# Patient Record
Sex: MALE | Race: OTHER | Employment: FULL TIME | URBAN - METROPOLITAN AREA
[De-identification: names, ages, dates, MRNs, and addresses within clinical notes are randomized per-mention and may not be internally consistent; named-entity substitution may affect disease eponyms.]

---

## 2022-05-26 ENCOUNTER — HOSPITAL ENCOUNTER (EMERGENCY)
Age: 22
Discharge: HOME | End: 2022-05-26

## 2022-05-26 VITALS
DIASTOLIC BLOOD PRESSURE: 95 MMHG | WEIGHT: 190 LBS | TEMPERATURE: 97.6 F | OXYGEN SATURATION: 100 % | HEIGHT: 65 IN | RESPIRATION RATE: 16 BRPM | HEART RATE: 80 BPM | BODY MASS INDEX: 31.65 KG/M2 | SYSTOLIC BLOOD PRESSURE: 153 MMHG

## 2022-05-26 DIAGNOSIS — Q82.8 CUTIS VERTICIS GYRATA: Primary | ICD-10-CM

## 2022-05-26 PROCEDURE — 99282 EMERGENCY DEPT VISIT SF MDM: CPT

## 2022-05-26 ASSESSMENT — PAIN - FUNCTIONAL ASSESSMENT: PAIN_FUNCTIONAL_ASSESSMENT: 0-10

## 2022-05-26 ASSESSMENT — PAIN SCALES - GENERAL: PAINLEVEL_OUTOF10: 3

## 2022-05-26 NOTE — ED PROVIDER NOTES
[facility]        Pt Name: Timothy Craig  MRN: [id number]  [place] 2000  Date of evaluation: 5/26/2022  Provider: VICTOR MANUEL Hensley  PCP: No primary care provider on file. Note Started: 2:14 PM EDT       ADAIR. I have evaluated this patient. My supervising physician was available for consultation. CHIEF COMPLAINT       Chief Complaint   Patient presents with    Wound Infection     pt noticied several bumps on his head, states they are painful, no new shampoo       HISTORY OF PRESENT ILLNESS   (Location, Timing/Onset, Context/Setting, Quality, Duration, Modifying Factors, Severity, Associated Signs and Symptoms)  Note limiting factors. Chief Complaint: Lumps on scalp bilaterally    Terrance Morin is a 22 y.o. male who presents lumps over his scalp bilaterally. Patient denies any new shampoos or lotions or anything. Patient states that he does use a helmet while at work. 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 Patient states that pain is very minor and he has not needed to take any medication for his pain. Patient denies any fevers, chills, nausea, vomiting, abdominal pain or chest pain or shortness of breath. Nursing Notes were all reviewed and agreed with or any disagreements were addressed in the HPI. REVIEW OF SYSTEMS    (2-9 systems for level 4, 10 or more for level 5)     Review of Systems    Positives and Pertinent negatives as per HPI. Except as noted above in the ROS, all other systems were reviewed and negative. PAST MEDICAL HISTORY   No past medical history on file. SURGICAL HISTORY   No past surgical history on file. CURRENTMEDICATIONS       There are no discharge medications for this patient. ALLERGIES     Patient has no known allergies. FAMILYHISTORY     No family history on file. SOCIAL HISTORY       Social History     Tobacco Use    Smoking status: Light Tobacco Smoker    Smokeless tobacco: Never Used   Vaping Use    Vaping Use: Never used   Substance Use Topics    Alcohol use: Yes     Comment: socially    Drug use: Never       SCREENINGS    [place] Coma Scale  Eye Opening: Spontaneous  Best Verbal Response: Oriented  Best Motor Response: Obeys commands  [place] Coma Scale Score: 15        PHYSICAL EXAM    (up to 7 for level 4, 8 or more for level 5)     ED Triage Vitals [05/26/22 1325]   BP Temp Temp Source Heart Rate Resp SpO2 Height Weight   (!) 153/95 97.6 °F (36.4 °C) Tympanic 80 16 100 % 5' 5\" (1.651 m) 190 lb (86.2 kg)       Physical Exam  Vitals and nursing note reviewed. Constitutional:       General: He is not in acute distress. Appearance: Normal appearance. He is normal weight. He is not ill-appearing. HENT:      Head:      Comments: Palpable lumps on scalp bilaterally running anterior to posterior to them. The skin appears clear without any signs of infection, lesions or vesicles. There is mild tenderness to palpation over these areas. Cardiovascular:      Rate and Rhythm: Normal rate and regular rhythm. Pulses: Normal pulses. Heart sounds: Normal heart sounds. Pulmonary:      Effort: Pulmonary effort is normal.      Breath sounds: Normal breath sounds. Musculoskeletal:      Cervical back: Normal range of motion and neck supple. Skin:     General: Skin is warm. Neurological:      Mental Status: He is alert and oriented to person, place, and time. Psychiatric:         Mood and Affect: Mood normal.         DIAGNOSTIC RESULTS   LABS:    Labs Reviewed - No data to display    When ordered only abnormal lab results are displayed. All other labs were within normal range or not returned as of this dictation. EKG:  When ordered, EKG's are interpreted by the Emergency Department Physician in the absence of a cardiologist.  Please see their note for interpretation of EKG. RADIOLOGY:   Non-plain film images such as CT, Ultrasound and MRI are read by the radiologist. Plain radiographic images are visualized and preliminarily interpreted by the ED Provider with the below findings:        Interpretation per the Radiologist below, if available at the time of this note:    No orders to display     No results found. PROCEDURES   Unless otherwise noted below, none     Procedures    CRITICAL CARE TIME       CONSULTS:  None      EMERGENCY DEPARTMENT COURSE and DIFFERENTIAL DIAGNOSIS/MDM:   Vitals:    Vitals:    05/26/22 1325   BP: (!) 153/95   Pulse: 80   Resp: 16   Temp: 97.6 °F (36.4 °C)   TempSrc: Tympanic   SpO2: 100%   Weight: 190 lb (86.2 kg)   Height: 5' 5\" (1.651 m)       Patient was given the following medications:  Medications - No data to display      Is this patient to be included in the SEP-1 Core Measure due to severe sepsis or septic shock? No   Exclusion criteria - the patient is NOT to be included for SEP-1 Core Measure due to: Infection is not suspected    80-year-old male presents with bumps on his head over the last several days. Patient is mildly tender at times. Patient states that he has been wearing a helmet at work over the last several weeks. On exam there is no signs of acute infection, skin lesions or concern for tinea infection. There is noticeable lumps bilaterally over the top of scalp around anterior to posterior. Patient states that he was told by a physician in [facility] that he has cutis verticis gyrata. This seems consistent with his symptoms today. I have no concern for any acute infection at this time. Patient be discharged at this time given primary care follow-up to continue to monitor these areas. Patient denies any fevers chills or headaches. All questions were answered at time of discharge. FINAL IMPRESSION      1.  Cutis verticis gyrata          DISPOSITION/PLAN   DISPOSITION Decision To Discharge 05/26/2022 02:29:28 PM      PATIENT REFERRED TO:  [facility] Emergency Department  [address] [place]  [phone]    As needed, If symptoms worsen    [facility]    Schedule an appointment as soon as possible for a visit in 1 week  recheck      DISCHARGE MEDICATIONS:  There are no discharge medications for this patient. DISCONTINUED MEDICATIONS:  There are no discharge medications for this patient.              (Please note that portions of this note were completed with a voice recognition program.  Efforts were made to edit the dictations but occasionally words are mis-transcribed.)    VICTOR MANUEL Salter (electronically signed)            Gracia Salterma  05/26/22 9393

## 2022-05-26 NOTE — Clinical Note
Roger Butler was seen and treated in our emergency department on 5/26/2022. He may return to work on 05/27/2022. If you have any questions or concerns, please don't hesitate to call.       VICTOR MANUEL Ugalde

## 2022-05-27 ENCOUNTER — HOSPITAL ENCOUNTER (EMERGENCY)
Age: 22
Discharge: HOME | End: 2022-05-27
Attending: EMERGENCY MEDICINE

## 2022-05-27 VITALS
TEMPERATURE: 98.6 F | DIASTOLIC BLOOD PRESSURE: 89 MMHG | RESPIRATION RATE: 16 BRPM | WEIGHT: 190 LBS | HEART RATE: 77 BPM | SYSTOLIC BLOOD PRESSURE: 144 MMHG | BODY MASS INDEX: 30.53 KG/M2 | HEIGHT: 66 IN | OXYGEN SATURATION: 99 %

## 2022-05-27 DIAGNOSIS — R55 NEAR SYNCOPE: Primary | ICD-10-CM

## 2022-05-27 LAB
EKG ATRIAL RATE: 71 BPM
EKG DIAGNOSIS: NORMAL
EKG P AXIS: 48 DEGREES
EKG P-R INTERVAL: 166 MS
EKG Q-T INTERVAL: 358 MS
EKG QRS DURATION: 100 MS
EKG QTC CALCULATION (BAZETT): 389 MS
EKG R AXIS: 53 DEGREES
EKG T AXIS: 19 DEGREES
EKG VENTRICULAR RATE: 71 BPM

## 2022-05-27 PROCEDURE — 99283 EMERGENCY DEPT VISIT LOW MDM: CPT

## 2022-05-27 PROCEDURE — 93005 ELECTROCARDIOGRAM TRACING: CPT | Performed by: NURSE PRACTITIONER

## 2022-05-27 PROCEDURE — 93010 ELECTROCARDIOGRAM REPORT: CPT | Performed by: INTERNAL MEDICINE

## 2022-05-27 ASSESSMENT — ENCOUNTER SYMPTOMS
CHEST TIGHTNESS: 0
ABDOMINAL PAIN: 0
NAUSEA: 0
DIARRHEA: 0
VOMITING: 0
SHORTNESS OF BREATH: 0

## 2022-05-27 ASSESSMENT — VISUAL ACUITY
OU: 20/30
OD: 20/25
OS: 20/30

## 2022-05-27 ASSESSMENT — PAIN DESCRIPTION - DESCRIPTORS: DESCRIPTORS: ACHING

## 2022-05-27 ASSESSMENT — PAIN - FUNCTIONAL ASSESSMENT: PAIN_FUNCTIONAL_ASSESSMENT: 0-10

## 2022-05-27 ASSESSMENT — PAIN DESCRIPTION - LOCATION: LOCATION: HEAD

## 2022-05-27 ASSESSMENT — PAIN DESCRIPTION - FREQUENCY: FREQUENCY: CONTINUOUS

## 2022-05-27 ASSESSMENT — LIFESTYLE VARIABLES
HOW OFTEN DO YOU HAVE A DRINK CONTAINING ALCOHOL: MONTHLY OR LESS
HOW MANY STANDARD DRINKS CONTAINING ALCOHOL DO YOU HAVE ON A TYPICAL DAY: 1 OR 2

## 2022-05-27 ASSESSMENT — PAIN SCALES - GENERAL: PAINLEVEL_OUTOF10: 7

## 2022-05-27 ASSESSMENT — PAIN DESCRIPTION - ORIENTATION: ORIENTATION: LEFT;RIGHT

## 2022-05-27 ASSESSMENT — PAIN DESCRIPTION - PAIN TYPE: TYPE: ACUTE PAIN

## 2022-05-27 ASSESSMENT — PAIN DESCRIPTION - ONSET: ONSET: ON-GOING

## 2022-05-27 NOTE — LETTER
[facility] [place] [place] Emergency Department  [address] [facility], [address]             May 27, 2022    Patient: Jamshid Martinez   YOB: 2000   Date of Visit: 5/27/2022       To Whom It May Concern:    Terrance Dao Arvizu was seen and treated in our emergency department on 5/27/2022. He may return to work on 5/28/2022.       Sincerely,         [facility]

## 2022-05-27 NOTE — ED PROVIDER NOTES
[facility]    Physician Attestation    Pt Name: Linda Gan  MRN: [id number]  [place] 2000  Date of evaluation: 5/27/22        Physician Note:    I havepersonally performed and/or participated in the history, exam and medical decision making and agree with all pertinent clinical information. I have also reviewed and agree with the past medical, family and social historyunless otherwise noted. I have personally performed a face to face diagnostic evaluation onthis patient. I have reviewed the mid-levels findings and agree. History: Was seen earlier for his on his scalp which has been diagnosed as cutis verticus gyrata thickening of the scalp states that since then he noticed that his vision became black for about a minute but he did not pass out he also had a headache on the left side of his head and felt pain on the right side of his body but he feels better now  Patient states that things went black for about a minute and he could not move he did not pass out completely    REVIEW OF SYSTEMS    Constitutional:  Denies fever, chills, or weakness   Eyes:  Denies vision changes  HENT:  Denies sore throat or neck pain   Respiratory:  Denies cough or shortness of breath   Cardiovascular:  Denies chest pain  GI:  Denies abdominal pain, nausea, vomiting, or diarrhea   Musculoskeletal:  Denies back pain   Skin: no rash or vesicles   Neurologic:  no headache weakness focal    Lymphatic:  no swollen  nodes   Psychiatric: no si or hs thoughts     All systems negative except as marked. General Appearance:  Alert, cooperative, no distress, appears stated age. Head:  Normocephalic, without obvious abnormality, atraumatic. Eyes:  conjunctiva/corneas clear, EOM's intact. Sclera anicteric.    ENT: Mucous membranes moist.  Patient does have thickened elevated areas of his scalp almost like grooves   Neck: Supple, symmetrical, trachea midline, no adenopathy. No jugular venous distention. Lungs:   No Respiratory Distress. no rales  rhonchi rub   Chest Wall:  Nontender  no deformity   Heart:  Rsr no murmer gallop    Abdomen:   Soft nontender no organomegally    Extremities:  Full range of motion. no deformity   Pulses: Equal  upper and lower    Skin:  No rashes or lesions to exposed skin. Neurologic: Alert and oriented X 3. Motor grossly normal.  Speech clear. EKG Interpretation    Interpreted by emergency department physician    Rhythm: normal sinus   Rate: normal  Axis: normal  Ectopy: none  Conduction: normal  ST Segments: no acute change  T Waves: no acute change  Q Waves: none    Clinical Impression: Normal sinus rhythm    Clara Calderon MD    EKG unremarkable exam is unremarkable patient is feeling much better visual acuity was documented patient will be discharged to follow-up with his doctor    1. Near syncope          DISPOSITION/PLAN  PATIENT REFERRED TO:  your doctor          DISCHARGE MEDICATIONS:  New Prescriptions    No medications on file         Is this patient to be included in the SEP-1 Core Measure due to severe sepsis or septic shock? No   Exclusion criteria - the patient is NOT to be included for SEP-1 Core Measure due to:   Infection is not suspected      MD Clara Cruz MD  05/27/22 5588

## 2022-05-27 NOTE — ED PROVIDER NOTES
[facility]        Pt Name: Nasra Locke  MRN: [id number]  [place] 2000  Date of evaluation: 5/27/2022  Provider: GRAYSON Rocha CNP  PCP: No primary care provider on file. Note Started: 3:01 AM EDT        I have seen and evaluated this patient with my supervising physician [address] [place] [address]       Chief Complaint   Patient presents with    Fatigue     Eyes went black when I woke up and pain over half my body (right) and headache on left side of my head. States he was seen for weakness earlier today. HISTORY OF PRESENT ILLNESS   (Location, Timing/Onset, Context/Setting, Quality, Duration, Modifying Factors, Severity, Associated Signs and Symptoms)  Note limiting factors. Chief Complaint: Brief episode of dark vision lasting less than 1 minute with mild headache on left side and felt pain to the right side of his body. Nasra Locke is a 22 y.o. male who presents to the emergency department with complaint of Brief episode of dark vision lasting less than 1 minute with mild headache on left side and felt pain to the right side of his body. Patient is asymptomatic at time of arrival.  He was transported per EMS. Patient seen in the emergency department earlier today and diagnosed with cutis verticus gyrata, thickening of the scalp. Denies any fever, lightheadedness, dizziness, visual disturbances. No chest pain or pressure. No neck or back pain. No shortness of breath, cough, or congestion. No abdominal pain, nausea, vomiting, diarrhea, constipation, or dysuria. No rash. Nursing Notes were all reviewed and agreed with or any disagreements were addressed in the HPI. REVIEW OF SYSTEMS    (2-9 systems for level 4, 10 or more for level 5)     Review of Systems   Constitutional: Negative for activity change, chills and fever.    Respiratory: Negative for chest tightness and shortness of breath. Cardiovascular: Negative for chest pain. Gastrointestinal: Negative for abdominal pain, diarrhea, nausea and vomiting. Genitourinary: Negative for dysuria. Neurological: Positive for light-headedness. All other systems reviewed and are negative. Positives and Pertinent negatives as per HPI. Except as noted above in the ROS, all other systems were reviewed and negative. PAST MEDICAL HISTORY   History reviewed. No pertinent past medical history. SURGICAL HISTORY   History reviewed. No pertinent surgical history. CURRENTMEDICATIONS     There are no discharge medications for this patient. ALLERGIES     Patient has no known allergies. FAMILYHISTORY     History reviewed. No pertinent family history. SOCIAL HISTORY       Social History     Tobacco Use    Smoking status: Light Tobacco Smoker    Smokeless tobacco: Never Used   Vaping Use    Vaping Use: Never used   Substance Use Topics    Alcohol use: Yes     Comment: socially    Drug use: Never       SCREENINGS    [place] Coma Scale  Eye Opening: Spontaneous  Best Verbal Response: Oriented  Best Motor Response: Obeys commands  [place] Coma Scale Score: 15        PHYSICAL EXAM    (up to 7 for level 4, 8 or more for level 5)     ED Triage Vitals [05/27/22 0030]   BP Temp Temp Source Heart Rate Resp SpO2 Height Weight   (!) 144/89 98.6 °F (37 °C) Oral 77 16 99 % 5' 6\" (1.676 m) 190 lb (86.2 kg)       Physical Exam  Vitals and nursing note reviewed. Constitutional:       Appearance: He is well-developed. He is not diaphoretic. HENT:      Head: Normocephalic and atraumatic. Right Ear: Tympanic membrane and external ear normal.      Left Ear: Tympanic membrane and external ear normal.      Mouth/Throat:      Mouth: Mucous membranes are moist.   Eyes:      General:         Right eye: No discharge. Left eye: No discharge. Neck:      Vascular: No JVD.    Cardiovascular:      Rate and Rhythm: Normal rate and regular rhythm. Pulses: Normal pulses. Heart sounds: Normal heart sounds. Pulmonary:      Effort: Pulmonary effort is normal. No respiratory distress. Breath sounds: Normal breath sounds. Abdominal:      Palpations: Abdomen is soft. Musculoskeletal:         General: Normal range of motion. Cervical back: Normal range of motion and neck supple. Skin:     General: Skin is warm and dry. Coloration: Skin is not pale. Neurological:      Mental Status: He is alert and oriented to person, place, and time. Cranial Nerves: Cranial nerves are intact. Sensory: Sensation is intact. Motor: Motor function is intact. Coordination: Coordination is intact. Gait: Gait is intact. Psychiatric:         Behavior: Behavior normal.         DIAGNOSTIC RESULTS   LABS:    Labs Reviewed - No data to display    When ordered only abnormal lab results are displayed. All other labs were within normal range or not returned as of this dictation. EKG: When ordered, EKG's are interpreted by the Emergency Department Physician in the absence of a cardiologist.  Please see their note for interpretation of EKG. RADIOLOGY:   Non-plain film images such as CT, Ultrasound and MRI are read by the radiologist. Plain radiographic images are visualized and preliminarily interpreted by the ED Provider with the below findings:        Interpretation per the Radiologist below, if available at the time of this note:    No orders to display     No results found.         PROCEDURES   Unless otherwise noted below, none     Procedures    CRITICAL CARE TIME       CONSULTS:  None      EMERGENCY DEPARTMENT COURSE and DIFFERENTIAL DIAGNOSIS/MDM:   Vitals:    Vitals:    05/27/22 0030   BP: (!) 144/89   Pulse: 77   Resp: 16   Temp: 98.6 °F (37 °C)   TempSrc: Oral   SpO2: 99%   Weight: 190 lb (86.2 kg)   Height: 5' 6\" (1.676 m)       Patient was given the following medications:  Medications - No data to display      Is this patient to be included in the SEP-1 Core Measure due to severe sepsis or septic shock? No   Exclusion criteria - the patient is NOT to be included for SEP-1 Core Measure due to: Infection is not suspected    Briefly, this is a 27-year-old male who was seen in the emergency department earlier today and diagnosed with thickening of the scalp. He reports that when he arrived home that he experienced vision that was dark with headache on left side of body and pain to the right side of his body that lasted less than 1 minute. He is asymptomatic at this time. The patient did call EMS for repeat transport to the emergency department. Patient was evaluated by attending physician. EKG reviewed. Visual acuity unremarkable. Patient is ambulatory without difficulty. He will be discharged home with outpatient follow-up and strict return precautions    Patient has a normal repeat neurological exam. At this time there is no indication of head injury, encephalopathy, multiple sclerosis, TIA/CVA, seizures, dehydration, hypoglycemia, mass lesions, metabolic cause, cardiac arrhythmia, PE, GI bleeding or orthostatic hypotension. Patient is stable throughout ED course and safe for outpatient follow-up. Patient made aware of treatment plan and verbally understood and agreed. Patient stable for outpatient follow-up with their family doctor in 24-48 hours. FINAL IMPRESSION      1. Near syncope          DISPOSITION/PLAN   DISPOSITION Decision To Discharge 05/27/2022 01:54:08 AM      PATIENT REFERRED TO:  your doctor            DISCHARGE MEDICATIONS:  There are no discharge medications for this patient. DISCONTINUED MEDICATIONS:  There are no discharge medications for this patient.              (Please note that portions of this note were completed with a voice recognition program.  Efforts were made to edit the dictations but occasionally words are mis-transcribed.)    GRAYSON Nagy - CNP (electronically signed)           GRAYSON Garrett - CNP  05/27/22 5399

## 2022-11-26 ENCOUNTER — HOSPITAL ENCOUNTER (EMERGENCY)
Age: 22
Discharge: HOME | End: 2022-11-26

## 2022-11-26 VITALS
OXYGEN SATURATION: 97 % | WEIGHT: 170 LBS | TEMPERATURE: 98.5 F | SYSTOLIC BLOOD PRESSURE: 137 MMHG | HEIGHT: 66 IN | DIASTOLIC BLOOD PRESSURE: 91 MMHG | HEART RATE: 58 BPM | BODY MASS INDEX: 27.32 KG/M2 | RESPIRATION RATE: 14 BRPM

## 2022-11-26 DIAGNOSIS — J02.9 ACUTE PHARYNGITIS, UNSPECIFIED ETIOLOGY: Primary | ICD-10-CM

## 2022-11-26 LAB
MONO TEST: NEGATIVE
S PYO AG THROAT QL: NEGATIVE

## 2022-11-26 PROCEDURE — 86308 HETEROPHILE ANTIBODY SCREEN: CPT

## 2022-11-26 PROCEDURE — 87880 STREP A ASSAY W/OPTIC: CPT

## 2022-11-26 PROCEDURE — 36415 COLL VENOUS BLD VENIPUNCTURE: CPT

## 2022-11-26 PROCEDURE — 99283 EMERGENCY DEPT VISIT LOW MDM: CPT

## 2022-11-26 PROCEDURE — 87081 CULTURE SCREEN ONLY: CPT

## 2022-11-26 ASSESSMENT — ENCOUNTER SYMPTOMS
EYE PAIN: 0
COLOR CHANGE: 0
EYE DISCHARGE: 0
RESPIRATORY NEGATIVE: 1
SHORTNESS OF BREATH: 0
CONSTIPATION: 0
DIARRHEA: 0
TROUBLE SWALLOWING: 1
NAUSEA: 0
RHINORRHEA: 0
SORE THROAT: 1
EYE ITCHING: 0
ABDOMINAL PAIN: 0
SINUS PAIN: 0
SINUS PRESSURE: 0
EYE REDNESS: 0
COUGH: 0
VOICE CHANGE: 0
VOMITING: 0
BACK PAIN: 0
CHEST TIGHTNESS: 0

## 2022-11-26 ASSESSMENT — PAIN - FUNCTIONAL ASSESSMENT: PAIN_FUNCTIONAL_ASSESSMENT: 0-10

## 2022-11-26 ASSESSMENT — PAIN SCALES - GENERAL: PAINLEVEL_OUTOF10: 6

## 2022-11-27 NOTE — ED PROVIDER NOTES
[facility]        Pt Name: Masha Duran  MRN: [id number]  [place] 2000  Date of evaluation: 11/26/2022  Provider: VICTOR MANUEL Estrada  PCP: No primary care provider on file. Note Started: 8:59 PM EST 11/26/22          ADAIR. I have evaluated this patient. My supervising physician was available for consultation. CHIEF COMPLAINT       Chief Complaint   Patient presents with    Pharyngitis     Throat pain x 2 weeks. HISTORY OF PRESENT ILLNESS   (Location, Timing/Onset, Context/Setting, Quality, Duration, Modifying Factors, Severity, Associated Signs and Symptoms)  Note limiting factors. Chief Complaint: Malik Rosario is a 25 y.o. male with no significant past medical history who presents to the ED with complaint of a sore throat. Has had sore throat for the past 2 weeks. Patient speaks primarily [address] and  was utilized for history and physical examination. Patient states had sore throat for the past 2 weeks. States he feels like he has blisters in his mouth into his posterior oropharynx. Denies any drooling, trismus, stridor or respiratory stress. Denies any fever chills. Denies sick contacts or recent travel. Denies history of similar symptoms in the past.  He denies any rhinorrhea, congestion, cough, ear pain/drainage, headache, neck pain/stiffness, chest pain, shortness of breath, abd pain or nausea/vomiting. Denies any decreased oral intake. Nursing Notes were all reviewed and agreed with or any disagreements were addressed in the HPI. REVIEW OF SYSTEMS    (2-9 systems for level 4, 10 or more for level 5)     Review of Systems   Constitutional:  Negative for activity change, appetite change, chills, diaphoresis, fatigue and fever. HENT:  Positive for mouth sores, sore throat and trouble swallowing.  Negative for congestion, ear discharge, ear pain, postnasal drip, rhinorrhea, sinus pressure, sinus pain and voice change. Eyes:  Negative for pain, discharge, redness and itching. Respiratory: Negative. Negative for cough, chest tightness and shortness of breath. Cardiovascular: Negative. Negative for chest pain, palpitations and leg swelling. Gastrointestinal:  Negative for abdominal pain, constipation, diarrhea, nausea and vomiting. Genitourinary:  Negative for decreased urine volume, difficulty urinating, dysuria, flank pain, frequency, hematuria and urgency. Musculoskeletal:  Negative for arthralgias, back pain, myalgias, neck pain and neck stiffness. Skin:  Negative for color change, pallor, rash and wound. Neurological:  Negative for dizziness, light-headedness and headaches. Positives and Pertinent negatives as per HPI. Except as noted above in the ROS, all other systems were reviewed and negative. PAST MEDICAL HISTORY   History reviewed. No pertinent past medical history. SURGICAL HISTORY   History reviewed. No pertinent surgical history. [address]       Discharge Medication List as of 11/26/2022  7:51 PM            ALLERGIES     Patient has no known allergies. FAMILYHISTORY     History reviewed. No pertinent family history. SOCIAL HISTORY       Social History     Tobacco Use    Smoking status: Light Smoker    Smokeless tobacco: Never   Vaping Use    Vaping Use: Never used   Substance Use Topics    Alcohol use: Yes     Comment: socially    Drug use: Never       SCREENINGS             PHYSICAL EXAM    (up to 7 for level 4, 8 or more for level 5)     ED Triage Vitals [11/26/22 1733]   BP Temp Temp Source Heart Rate Resp SpO2 Height Weight   131/89 98.5 °F (36.9 °C) Oral 84 16 97 % 5' 6\" (1.676 m) 170 lb (77.1 kg)       Physical Exam  Constitutional:       General: He is not in acute distress. Appearance: Normal appearance. He is well-developed. He is not ill-appearing, toxic-appearing or diaphoretic.    HENT: Head: Normocephalic and atraumatic. Right Ear: Tympanic membrane, ear canal and external ear normal. There is no impacted cerumen. Left Ear: Tympanic membrane, ear canal and external ear normal. There is no impacted cerumen. Nose: Nose normal. No congestion or rhinorrhea. Mouth/Throat:      Lips: Pink. No lesions. Mouth: Mucous membranes are moist.      Tongue: No lesions. Palate: No lesions. Pharynx: Uvula midline. Posterior oropharyngeal erythema present. No pharyngeal swelling, oropharyngeal exudate or uvula swelling. Tonsils: No tonsillar exudate or tonsillar abscesses. Eyes:      General:         Right eye: No discharge. Left eye: No discharge. Conjunctiva/sclera: Conjunctivae normal.   Cardiovascular:      Rate and Rhythm: Normal rate and regular rhythm. Pulses: Normal pulses. Heart sounds: Normal heart sounds. No murmur heard. No friction rub. No gallop. Pulmonary:      Effort: Pulmonary effort is normal. No respiratory distress. Breath sounds: Normal breath sounds. No stridor. No wheezing, rhonchi or rales. Chest:      Chest wall: No tenderness. Abdominal:      General: Abdomen is flat. Bowel sounds are normal. There is no distension. Palpations: Abdomen is soft. There is no mass. Tenderness: There is no abdominal tenderness. There is no right CVA tenderness, left CVA tenderness, guarding or rebound. Hernia: No hernia is present. Musculoskeletal:         General: Normal range of motion. Cervical back: Normal range of motion and neck supple. No rigidity or tenderness. Lymphadenopathy:      Cervical: No cervical adenopathy. Skin:     General: Skin is warm and dry. Coloration: Skin is not pale. Findings: No erythema or rash. Neurological:      Mental Status: He is alert and oriented to person, place, and time.    Psychiatric:         Behavior: Behavior normal.       DIAGNOSTIC RESULTS LABS:    Labs Reviewed   STREP SCREEN GROUP A THROAT   CULTURE, BETA STREP CONFIRM PLATES   MONONUCLEOSIS SCREEN       When ordered only abnormal lab results are displayed. All other labs were within normal range or not returned as of this dictation. EKG: When ordered, EKG's are interpreted by the Emergency Department Physician in the absence of a cardiologist.  Please see their note for interpretation of EKG. RADIOLOGY:   Non-plain film images such as CT, Ultrasound and MRI are read by the radiologist. Plain radiographic images are visualized and preliminarily interpreted by the ED Provider with the below findings:        Interpretation per the Radiologist below, if available at the time of this note:    No orders to display     No results found. PROCEDURES   Unless otherwise noted below, none     Procedures    CRITICAL CARE TIME       CONSULTS:  None      EMERGENCY DEPARTMENT COURSE and DIFFERENTIAL DIAGNOSIS/MDM:   Vitals:    Vitals:    11/26/22 1733 11/26/22 1949   BP: 131/89 (!) 137/91   Pulse: 84 58   Resp: 16 14   Temp: 98.5 °F (36.9 °C)    TempSrc: Oral    SpO2: 97% 97%   Weight: 170 lb (77.1 kg)    Height: 5' 6\" (1.676 m)        Patient was given the following medications:  Medications - No data to display      Is this patient to be included in the SEP-1 Core Measure due to severe sepsis or septic shock? No   Exclusion criteria - the patient is NOT to be included for SEP-1 Core Measure due to: Infection is not suspected    Patient is a 43-year-old male who presents to the ED with complaint of a sore throat. Patient has had sore throat for the past 2 weeks. Upon examination he has what appears to be petechiae to the posterior oropharynx there is concern for viral ideology. He has no lymphadenopathy. Specifically no postauricular lymphadenopathy. There is no meningismus. He has some posterior oropharynx erythema but there is no significant tonsillar enlargement or exudate.   No asymmetric enlargement noted. Uvula is midline. No drooling, trismus, stridor or respiratory stress noted. No changes in phonation. Mono was negative. Strep swab negative. Likely some from acute pharyngitis. Believe most likely viral.  Will give Magic mouthwash for home. Follow-up with PCP. Given return precautions. Low suspicion for strep pharyngitis, PTA retropharyngeal abscess, epiglottitis, bacterial tracheitis, respiratory stress or other emergent etiology at this time. Do not believe further imaging or work-up indicated. FINAL IMPRESSION      1. Acute pharyngitis, unspecified etiology          DISPOSITION/PLAN   DISPOSITION Decision To Discharge 11/26/2022 07:49:36 PM      PATIENT REFERRED TO:  [facility] Emergency Department  [address]  [facility]  [phone]  Go to   As needed, If symptoms worsen    [facility]) Pre-Services  [phone]          DISCHARGE MEDICATIONS:  Discharge Medication List as of 11/26/2022  7:51 PM        START taking these medications    Details   Magic Mouthwash (MIRACLE MOUTHWASH) Swish and spit 10 mLs 4 times daily as needed for Irritation Dispense as Magic Mouthwash. Please add Equal Parts: 60 mL Maalox, 60 mL Viscous Lidocaine, 60 mL Benadryl to 60mL of Carafate.   10 ml swish and spit or swallow as directed above., Disp-240 mL , R-0Print             DISCONTINUED MEDICATIONS:  Discharge Medication List as of 11/26/2022  7:51 PM                 (Please note that portions of this note were completed with a voice recognition program.  Efforts were made to edit the dictations but occasionally words are mis-transcribed.)    Jene Runner, PA (electronically signed)           VICTOR MANUEL Mendez  11/26/22 9783

## 2022-11-28 LAB — S PYO THROAT QL CULT: NORMAL

## 2025-03-28 ENCOUNTER — OFFICE VISIT (OUTPATIENT)
Age: 25
End: 2025-03-28

## 2025-03-28 VITALS
TEMPERATURE: 98.3 F | HEART RATE: 77 BPM | BODY MASS INDEX: 30.67 KG/M2 | SYSTOLIC BLOOD PRESSURE: 126 MMHG | DIASTOLIC BLOOD PRESSURE: 81 MMHG | OXYGEN SATURATION: 97 % | WEIGHT: 190 LBS

## 2025-03-28 DIAGNOSIS — J02.9 PHARYNGITIS, UNSPECIFIED ETIOLOGY: Primary | ICD-10-CM

## 2025-03-28 RX ORDER — OMEPRAZOLE 20 MG/1
20 CAPSULE, DELAYED RELEASE ORAL DAILY
COMMUNITY

## 2025-03-28 RX ORDER — AMOXICILLIN 500 MG/1
500 CAPSULE ORAL 2 TIMES DAILY
Qty: 20 CAPSULE | Refills: 0 | Status: SHIPPED | OUTPATIENT
Start: 2025-03-28 | End: 2025-04-07

## 2025-03-28 ASSESSMENT — ENCOUNTER SYMPTOMS
EYES NEGATIVE: 1
SORE THROAT: 1
RESPIRATORY NEGATIVE: 1

## 2025-03-28 NOTE — PROGRESS NOTES
Terrance Perdue (:  2000) is a 24 y.o. male,New patient, here for evaluation of the following chief complaint(s):  Pharyngitis (Pt c/o sore throat with blisters)      ASSESSMENT/PLAN:  1. Pharyngitis, unspecified etiology    - amoxicillin (AMOXIL) 500 MG capsule; Take 1 capsule by mouth 2 times daily for 10 days  Dispense: 20 capsule; Refill: 0      - [facility]   Pt request referral for evaluation of blisters that intermittently appear under tongue causing discomfort.    Return if symptoms worsen or fail to improve.    SUBJECTIVE/OBJECTIVE:  24 year old (Nepali speaking only) male presents to clinic with c/o headache and worsening sore throat over the last week.  Pt is also concerned about small blisters that sometimes appear under tongue that cause discomfort.       History provided by:  Patient      Vitals:    25 1216   BP: 126/81   BP Site: Right Upper Arm   Patient Position: Sitting   BP Cuff Size: Large Adult   Pulse: 77   Temp: 98.3 °F (36.8 °C)   TempSrc: Oral   SpO2: 97%   Weight: 86.2 kg (190 lb)       Review of Systems   Constitutional:  Positive for fatigue and fever (feverish).   HENT:  Positive for sore throat.    Eyes: Negative.    Respiratory: Negative.     Cardiovascular: Negative.    Endocrine: Negative.    Genitourinary: Negative.    Musculoskeletal: Negative.    Neurological:  Positive for headaches. Negative for dizziness.       Physical Exam  Vitals and nursing note reviewed.   Constitutional:       Appearance: Normal appearance.   HENT:      Head: Normocephalic.      Nose: Nose normal.      Mouth/Throat:      Pharynx: Posterior oropharyngeal erythema (moderate erythema, small amount of exudate, slightly edematous uvula) present.   Eyes:      Pupils: Pupils are equal, round, and reactive to light.   Cardiovascular:      Rate and Rhythm: Normal rate and regular rhythm.      Pulses: Normal pulses.      Heart sounds: Normal heart sounds.   Pulmonary:

## 2025-07-07 ENCOUNTER — OFFICE VISIT (OUTPATIENT)
Dept: ENT CLINIC | Age: 25
End: 2025-07-07
Payer: COMMERCIAL

## 2025-07-07 VITALS — SYSTOLIC BLOOD PRESSURE: 146 MMHG | DIASTOLIC BLOOD PRESSURE: 88 MMHG | WEIGHT: 190 LBS | BODY MASS INDEX: 30.67 KG/M2

## 2025-07-07 DIAGNOSIS — S00.512A: ICD-10-CM

## 2025-07-07 DIAGNOSIS — K14.6 BURNING TONGUE: ICD-10-CM

## 2025-07-07 DIAGNOSIS — K14.9 TONGUE IRRITATION: Primary | ICD-10-CM

## 2025-07-07 PROCEDURE — G8427 DOCREV CUR MEDS BY ELIG CLIN: HCPCS | Performed by: STUDENT IN AN ORGANIZED HEALTH CARE EDUCATION/TRAINING PROGRAM

## 2025-07-07 PROCEDURE — 99204 OFFICE O/P NEW MOD 45 MIN: CPT | Performed by: STUDENT IN AN ORGANIZED HEALTH CARE EDUCATION/TRAINING PROGRAM

## 2025-07-07 PROCEDURE — G8417 CALC BMI ABV UP PARAM F/U: HCPCS | Performed by: STUDENT IN AN ORGANIZED HEALTH CARE EDUCATION/TRAINING PROGRAM

## 2025-07-07 PROCEDURE — 4004F PT TOBACCO SCREEN RCVD TLK: CPT | Performed by: STUDENT IN AN ORGANIZED HEALTH CARE EDUCATION/TRAINING PROGRAM

## 2025-07-07 RX ORDER — TRIAMCINOLONE ACETONIDE 0.1 %
PASTE (GRAM) DENTAL
Qty: 1 EACH | Refills: 11 | Status: SHIPPED | OUTPATIENT
Start: 2025-07-07

## 2025-07-07 NOTE — CONSULTS
Session ID: [id number]  Session Duration: Longer than 54 minutes  Language: German   ID: #[id number]   Name: Gt

## 2025-07-07 NOTE — PROGRESS NOTES
[facility]  DIVISION OF OTOLARYNGOLOGY- HEAD & NECK SURGERY  CONSULT      Terrance Perdue (:  2000) is a 24 y.o. male, here for evaluation of the following chief complaint(s):  New Patient (Pt stated that he is here today because he has developed small bumps under his tongue. Pt stated that they have been there for about 2 years now and they were not causing him pain in the beginning but thay are starting to cause him pain now )      ASSESSMENT/PLAN:  1. Tongue irritation  2. Abrasion of floor of mouth  3. Burning tongue         This is a very pleasant 24 y.o. male here today for evaluation of the the above-noted complaints.      Assessment & Plan  1.  Irritation of the floor of mouth  - Symptoms suggest inflammation or swelling of minor salivary glands located in the floor of mouth and along the lingual ducts  - Possible causes: dietary triggers, inadequate hydration, irritation from teeth  - No evidence of mouth cancer or anything serious on exam  - Increase water intake to maintain oral lubrication and promote salivary gland function  - Identify and avoid potential dietary triggers (acidic or spicy foods)  - Recommend dental check-up to ensure oral health and prevent further irritation from teeth  - Prescription for steroid cream provided    - Apply to affected area after drying undersurface of tongue with tissue or paper towel    - Leave cream to sit on the area  - Condition is not life-threatening.  However, prognosis is uncertain.  - Could consider treating for burning tongue if symptoms do not improve with treatment.      Medical Decision Making:  The following items were considered in medical decision making:  Independent review of images  Review / order clinical lab tests  Review / order radiology tests  Decision to obtain old records  [facility]    SUBJECTIVE/OBJECTIVE:  HPI    History of Present Illness  The patient